# Patient Record
(demographics unavailable — no encounter records)

---

## 2024-12-19 NOTE — DATA REVIEWED
[No studies available for review at this time] : No studies available for review at this time [de-identified] : Right - mild HL after 4000Hz Left - mild to moderate sensorineural hearing loss after 1000Hz Impedance testing reveals normal Type A tympanograms bilaterally

## 2024-12-19 NOTE — HISTORY OF PRESENT ILLNESS
[de-identified] : 69 y/o F Hx of Waldenstrom's Macroglobulinemia.  Herewith many years of nasal congestion, infrequent sinus pressure.  One sinus infection per year.  Uses Nasacort daily with some improvement, but congestion not resolved.   Used Flonase in the past however felt it stopped working for her.  Sense of smell and taste are good.   Pos Rhinitis every morning for about one hour.  Also gets "sharp pain" headache to top, sides and back of head with change in position, mostly bending, lasts only 1-2 seconds at a time.   Does get Vertigo that lasts a few seconds when turning her head.  Feels it occurs more with turning left and started about 6-10 months ago.  No acute change in hearing.  Pos tinnitus that started with taking Wellbutrin.   [FreeTextEntry1] : She is here for f/u. She has been having bilateral clogged ears, tinnitus and intermittent pain in bilateral ears that has been going on for a month. She states her hearing has become worse since her ears have been feeling clogged. She states the clogged ear sensation and tinnitus is constant every day.  Pt denies any nasal drainage, PND, facial pain or pressure, runny nose or sinus infections.  Pt denies any ear drainage. She states she is still having vertigo with certain movemetns where she will feel the room spinning.

## 2024-12-19 NOTE — END OF VISIT
[FreeTextEntry3] : I personally saw and examined Ms. CRIS RAMÍREZ in detail this visit today. I personally reviewed the HPI, PMH, FH, SH, ROS and medications/allergies. I have spoken to MERLIN Ventura regarding the history and have personally determined the assessment and plan of care, and documented this myself. I was present and participated in all key portions of the encounter and all procedures noted above. I have made changes in the body of the note where appropriate.   Attesting Faculty: See Attending Signature Below

## 2024-12-19 NOTE — PHYSICAL EXAM
[Normal] : tympanic membranes are normal in both ears [de-identified] : bilateral cerumen impaction

## 2024-12-19 NOTE — ASSESSMENT
[FreeTextEntry1] : 69y/o female who has been having bilateral clogged ears, tinnitus and intermittent pain in bilateral ears that has been going on for a month.  Excessive wax in bilateral ears -Wax impaction removed from bilateral ear canals   Tinnitus -Audio shows mild HF SNHL at 6/8k but otherwise WNL  Vertigo - VNG done 3/2022 reviewed  - Recommend vestibular therapy

## 2024-12-19 NOTE — PROCEDURE
[FreeTextEntry3] : Cerumen removed with suction and curette from bilateral ear canals. After removal of cerumen canal noted to be normal without edema, purulence or inflammation. Patient tolerated procedure well.

## 2024-12-27 NOTE — RESULTS/DATA
[FreeTextEntry1] : WBC 16,130 Hgb 11.1 Hct 36.9 MCV 96.6 Platelets 361,000 Diff 45P 44L 7M 1 Imm gran 2Eo 1Ba ANC 7,280   9/14/24 CT abdomen and Pelvis w/ Oral Contrast Impression: No acute intra-abdominal pathology.   9/10/24 CMP eGFR 54  SPEP M-spike 0.9, gamma-migrating paraprotein identified.  IgG 566 IgA 45 IgM 1352  SFLC kappa 4.83 lambda 1.53 ratio 3.16  Viscosity Serum 1.9

## 2024-12-27 NOTE — ADDENDUM
[FreeTextEntry1] :  I, Carroll Beth, acted solely as a scribe for Dr. Santosh Van on 12/27/2024.  All medical entries made by the Scribe were at my, Dr. Santosh Van's, direction and personally dictated by me on 12/27/2024 . I have reviewed the chart and agree that the record accurately reflects my personal performance of the history, physical exam, assessment and plan. I have also personally directed, reviewed, and agreed with the chart.

## 2024-12-27 NOTE — HISTORY OF PRESENT ILLNESS
[Disease:__________________________] : Disease: [unfilled] [de-identified] : 2000; 2018 Right breast DCIS- ER/SC+; Anastrozole to 12/2023 2008 thyroid cancer -- thyroidectomy; DOBBINS Iron deficiency anemia Leukocytosis [de-identified] : BM+ in 2011 [de-identified] : She feels well. Feeling of fullness below her right ribs area along with dull pain radiating to her back has resolved. Chronic positional vertigo persists. Also notes occasional swollen glands. She notes no headaches, visual problems, fevers, night sweats, chest pain, SOB, abdominal pain, bleeding, bruising, melena, BRBPR, hematuria, vaginal bleeding. Weight is stable. Had flu shot, COVID booster, and RSV vaccine.

## 2024-12-27 NOTE — PHYSICAL EXAM
[Fully active, able to carry on all pre-disease performance without restriction] : Status 0 - Fully active, able to carry on all pre-disease performance without restriction [Normal] : affect appropriate [de-identified] : Ventral hernia

## 2024-12-27 NOTE — ASSESSMENT
[Palliative Care Plan] : not applicable at this time [FreeTextEntry1] : 69 YO F with Waldenstrom's macroglobulinemia diagnosed in 2011 who has been clinically stable and under observation alone. She has had a chronic mild anemia attributed to Waldenstrom's and a chronic unexplained leukocytosis - perhaps related to lithium therapy.  Plan: Observe CMP, LDH, SPEP, Quantitative immunoglobulins, SFLC, Viscosity RTC 3 months.

## 2025-01-14 NOTE — PHYSICAL EXAM
[Alert] : alert [Oriented x 3] : ~L oriented x 3 [Well Nourished] : well nourished [Conjunctiva Non-injected] : conjunctiva non-injected [No Visual Lymphadenopathy] : no visual  lymphadenopathy [No Clubbing] : no clubbing [No Edema] : no edema [No Bromhidrosis] : no bromhidrosis [No Chromhidrosis] : no chromhidrosis [FreeTextEntry3] : The patient is well-appearing, in no acute distress, alert and oriented x 3. Mood and affect are normal. A complete cutaneous examination of the scalp, face, neck, chest, abdomen, back, bilateral arms, bilateral legs, buttocks, digits, nails, eyelids, conjunctiva and lips reveals the following significant findings:\par  -Tan to dark brown stuck-on plaques on the trunk and extremities \par  -Tan to dark brown macules on the trunk and extremities with no concerning dermoscopic features

## 2025-01-14 NOTE — HISTORY OF PRESENT ILLNESS
[FreeTextEntry1] : growths [de-identified] : 70F with no personal hx of skin cancer here for growths on the trunk and abdomen. Otherwise, no new, evolving, or symptomatic skin lesions.   Hx of DCIS and Waldenstrom's Macroglobulenima

## 2025-02-19 NOTE — REVIEW OF SYSTEMS
[Feeling Fatigued] : feeling fatigued [Change in Appetite] : no change in appetite [Dizziness] : dizziness [Negative] : Cardiovascular [FreeTextEntry3] : conjunctival hemorrhage, no pain or vision loss [de-identified] : stable [de-identified] : no recent changes

## 2025-02-19 NOTE — HISTORY OF PRESENT ILLNESS
[FreeTextEntry1] : Interval History:   History: She reports intermittent dizziness, room spinning for about a year, occurs daily. This occurs when siting down on a cushy, soft chair or when turning her head to the side. No syncope. No chest pain. No palpitations. No falls. She was evaluated by an ENT and Neurologist. There was no evidence of vertigo and MRI of the brain was normal. She was advised to have cardiovascular evaluation. She denies any history of migraine or pulsatile tinnitus.  She thinks the symptoms correlate with adjustment of her thyroid hormone replacement, which was increased to 88 mcg, but then decreased to 75 mcg at her urging. She plans to establish care with a new Endocrinologist, scheduled in January.  Not aware of any history of cardiovascular disease. No recent lipids, A1c. Non smoker and no family history of premature/early CAD.  2023: She continues to feel dizzy intermittently, particularly when sitting down. No exertional symptoms. No appreciable improvement since adjustment of levothyroxine.  Saw Dr. Blake and remains on levothyroxine. Carotid duplex - bilateral heterogenous internal carotid plaque, non-obstructive. 14 day patch monitor showed no arrhythmia and normal HR during symptomatic episodes. Echo normal function.  Daily home BP readings over the past two months reviewed, SBP 120s-150s, majority 135-140. Normal diastolic BP.  2023: She notes increased fatigue (falls asleep on the couch in the late afternoon) and weight loss. TSH/FT4 normal in July, on 75 levothyroxine. Continues to have dizziness when she sits down or moves quickly. No palpitations. No chest pain. No shortness of breath. IgM levels stable, now followed by Dr. Van, who ordered abdomen/pelvic CT given weight loss. Dr. Winslow checked lipids and told her they were "good" - colonoscopy also up to date per patient.  2024: Feels OK. Dizziness less bothersome lately, but still present. Occurs when turning her head at times. BP mostly controlled on amlodipine. Has appointment with her endocrinologist. Shi remains quiescent.  2024: Decided to retire from her job at the [employer] due to fatigue. Labs done by Dr. Van this summer for Sommer's stable. Her Psychiatrist recently raised concerns about an increase in the creatinine on blood tests done to monitor chronic lithium therapy, however, no changes made. Positional dizziness persists. BP is controlled on current therapy. Weight back up a few pounds.   Cardiovascular Summary: ---------------------------------------------- EC2025:  2024: NSR 67 bpm, normal ECG and QTc = 431 ms 2024: NSR, possible LAE, QTc 452 ms, otherwise normal 2023: NSR 70 bpm, normal ECG 2/10/2023: NSR 78 bpm, possible LAE. No significant change from prior. 2022: NSR 80 bpm, normal ECG ---------------------------------------------- Remote/ambulatory rhythm monitorin2022: 14 day Zio patch monitor, rare VPCs, sinus rhythm during symptomatic episodes --------------------------------------------- Echo: 2023: EF 68 %, GLS -18, no structural heart disease -------------------------------------------- Carotid: 22: mild heterogeneous plaque in proximal internal carotids, 16-49 % bilaterally.  -------------------------------------------- Radiology: 2023: CT abdomen/pelvis - no acute abnormalities or interval changes 2023: CT-angio head/neck - normal study 2022: mild atherosclerosis of aorta

## 2025-02-19 NOTE — REASON FOR VISIT
Normal [Spouse] : spouse [FreeTextEntry3] : Dr. Naidu, Dr. Van [FreeTextEntry1] : ----------------------------------------------------------------------- CRIS RAMÍREZ is a 70-year-old woman with history of DCIS, Waldenstrom's macroglobulinemia, thyroidectomy in 2008, bipolar disorder on lithium, hypertension, and carotid atherosclerosis seen for follow up of dizziness and cardiovascular risk factors.  Prior Cancer Treatments: ------------------------------------------------------------------------ Chemo/targeted therapy: Anastrozole 4/2019-2023 ------------------------------------------------------------------------ Surgery: 2000, 1/2/2019: right lumpectomy ------------------------------------------------------------------------ Radiation: none

## 2025-02-19 NOTE — PHYSICAL EXAM
[Carotid Bruit] : carotid bruit [Normal] : normal S1, S2, no murmur, no rub, no gallop [Clear Lung Fields] : clear lung fields [Good Air Entry] : good air entry [No Respiratory Distress] : no respiratory distress  [Normal Gait] : normal gait [Gait - Sufficient for Exercise Testing] : gait - sufficient for exercise testing [No Edema] : no edema [No Cyanosis] : no cyanosis [No Clubbing] : no clubbing [Moves all extremities] : moves all extremities [Normal Speech] : normal speech [Alert and Oriented] : alert and oriented [Normal memory] : normal memory [de-identified] : subconjunctival blood  [de-identified] : right carotid

## 2025-02-19 NOTE — PHYSICAL EXAM
[Carotid Bruit] : carotid bruit [Normal] : normal S1, S2, no murmur, no rub, no gallop [Clear Lung Fields] : clear lung fields [Good Air Entry] : good air entry [No Respiratory Distress] : no respiratory distress  [Normal Gait] : normal gait [Gait - Sufficient for Exercise Testing] : gait - sufficient for exercise testing [No Edema] : no edema [No Cyanosis] : no cyanosis [No Clubbing] : no clubbing [Moves all extremities] : moves all extremities [Normal Speech] : normal speech [Alert and Oriented] : alert and oriented [Normal memory] : normal memory [de-identified] : subconjunctival blood  [de-identified] : right carotid

## 2025-02-19 NOTE — REASON FOR VISIT
[Spouse] : spouse [FreeTextEntry3] : Dr. Naidu, Dr. Van [FreeTextEntry1] : ----------------------------------------------------------------------- CRIS RAMÍREZ is a 70-year-old woman with history of DCIS, Waldenstrom's macroglobulinemia, thyroidectomy in 2008, bipolar disorder on lithium, hypertension, and carotid atherosclerosis seen for follow up of dizziness and cardiovascular risk factors.  Prior Cancer Treatments: ------------------------------------------------------------------------ Chemo/targeted therapy: Anastrozole 4/2019-2023 ------------------------------------------------------------------------ Surgery: 2000, 1/2/2019: right lumpectomy ------------------------------------------------------------------------ Radiation: none

## 2025-02-19 NOTE — REVIEW OF SYSTEMS
[Feeling Fatigued] : feeling fatigued [Change in Appetite] : no change in appetite [Dizziness] : dizziness [Negative] : Cardiovascular [FreeTextEntry3] : conjunctival hemorrhage, no pain or vision loss [de-identified] : stable [de-identified] : no recent changes

## 2025-02-19 NOTE — PHYSICAL EXAM
[Carotid Bruit] : carotid bruit [Normal] : normal S1, S2, no murmur, no rub, no gallop [Clear Lung Fields] : clear lung fields [Good Air Entry] : good air entry [No Respiratory Distress] : no respiratory distress  [Normal Gait] : normal gait [Gait - Sufficient for Exercise Testing] : gait - sufficient for exercise testing [No Edema] : no edema [No Cyanosis] : no cyanosis [No Clubbing] : no clubbing [Moves all extremities] : moves all extremities [Normal Speech] : normal speech [Alert and Oriented] : alert and oriented [Normal memory] : normal memory [de-identified] : subconjunctival blood  [de-identified] : right carotid

## 2025-02-19 NOTE — REVIEW OF SYSTEMS
[Feeling Fatigued] : feeling fatigued [Change in Appetite] : no change in appetite [Dizziness] : dizziness [Negative] : Cardiovascular [FreeTextEntry3] : conjunctival hemorrhage, no pain or vision loss [de-identified] : stable [de-identified] : no recent changes

## 2025-02-19 NOTE — ASSESSMENT
[FreeTextEntry1] : ===================================================== 1. Dizziness: etiology undetermined at present. Carotid bruit on the right, but no tortuosity or significant stenosis noted on duplex ultrasound. Neuro and ENT workup not diagnostic. ? Lithium - no evidence of arrhythmia or cardiac cause - CT-angiogram of head/neck normal  2. Essential hypertension: BP controlled currently - amlodipine 2.5 mg daily  3. Waldenstrom's, Breast Cancer, atherosclerotic plaque: discussed ASCVD risk reduction with patient who participated in shared decision making. - rosuvastatin 5 mg daily for atherosclerosis, LDL 95 11/2022 - lipid panel 2/2024: LDL 44, non-HDL 61  4. Lithium associated renal concerns. Creatinine normal 6/18/2024, though increase from ~ 0.7 to ~ 1 over past year or so (with some variation) - provided referral to onco-Nephrology to advice - will have labs drawn by her psychiatrist forwarded to me  Above recommendations discussed all questions were answered to the best of my ability and her apparent satisfaction.

## 2025-02-19 NOTE — HISTORY OF PRESENT ILLNESS
[FreeTextEntry1] : Interval History:   History: She reports intermittent dizziness, room spinning for about a year, occurs daily. This occurs when siting down on a cushy, soft chair or when turning her head to the side. No syncope. No chest pain. No palpitations. No falls. She was evaluated by an ENT and Neurologist. There was no evidence of vertigo and MRI of the brain was normal. She was advised to have cardiovascular evaluation. She denies any history of migraine or pulsatile tinnitus.  She thinks the symptoms correlate with adjustment of her thyroid hormone replacement, which was increased to 88 mcg, but then decreased to 75 mcg at her urging. She plans to establish care with a new Endocrinologist, scheduled in January.  Not aware of any history of cardiovascular disease. No recent lipids, A1c. Non smoker and no family history of premature/early CAD.  2023: She continues to feel dizzy intermittently, particularly when sitting down. No exertional symptoms. No appreciable improvement since adjustment of levothyroxine.  Saw Dr. Blake and remains on levothyroxine. Carotid duplex - bilateral heterogenous internal carotid plaque, non-obstructive. 14 day patch monitor showed no arrhythmia and normal HR during symptomatic episodes. Echo normal function.  Daily home BP readings over the past two months reviewed, SBP 120s-150s, majority 135-140. Normal diastolic BP.  2023: She notes increased fatigue (falls asleep on the couch in the late afternoon) and weight loss. TSH/FT4 normal in July, on 75 levothyroxine. Continues to have dizziness when she sits down or moves quickly. No palpitations. No chest pain. No shortness of breath. IgM levels stable, now followed by Dr. Van, who ordered abdomen/pelvic CT given weight loss. Dr. Winslow checked lipids and told her they were "good" - colonoscopy also up to date per patient.  2024: Feels OK. Dizziness less bothersome lately, but still present. Occurs when turning her head at times. BP mostly controlled on amlodipine. Has appointment with her endocrinologist. Shi remains quiescent.  2024: Decided to retire from her job at the [employer] due to fatigue. Labs done by Dr. Van this summer for oSmmer's stable. Her Psychiatrist recently raised concerns about an increase in the creatinine on blood tests done to monitor chronic lithium therapy, however, no changes made. Positional dizziness persists. BP is controlled on current therapy. Weight back up a few pounds.   Cardiovascular Summary: ---------------------------------------------- EC2025:  2024: NSR 67 bpm, normal ECG and QTc = 431 ms 2024: NSR, possible LAE, QTc 452 ms, otherwise normal 2023: NSR 70 bpm, normal ECG 2/10/2023: NSR 78 bpm, possible LAE. No significant change from prior. 2022: NSR 80 bpm, normal ECG ---------------------------------------------- Remote/ambulatory rhythm monitorin2022: 14 day Zio patch monitor, rare VPCs, sinus rhythm during symptomatic episodes --------------------------------------------- Echo: 2023: EF 68 %, GLS -18, no structural heart disease -------------------------------------------- Carotid: 22: mild heterogeneous plaque in proximal internal carotids, 16-49 % bilaterally.  -------------------------------------------- Radiology: 2023: CT abdomen/pelvis - no acute abnormalities or interval changes 2023: CT-angio head/neck - normal study 2022: mild atherosclerosis of aorta

## 2025-02-25 NOTE — ADDENDUM
[FreeTextEntry1] : Blood will be drawn in office today.  This note was written by Cecily Bergman on 02/25/2025 acting as medical scribe for Dr. Artie Blake. I, Dr. Artie Blake, have read and attest that all the information, medical decision making and discharge instructions within are true and accurate.

## 2025-02-25 NOTE — HISTORY OF PRESENT ILLNESS
[FreeTextEntry1] : Ms. CRIS RAMÍREZ is a 70-year-old female who returns with regard to a history of thyroid cancer s/p total thyroidectomy 2008-Do not have details with regard to her thyroid ca and she too is unsure of the type of thyroid cancer. I have again asked that she try to obtain old records. She reports needing to take radioactive pill after the surgery.   Medical History: Rosacea, Waldenstrom's Disease dx 2011, depression, infertility, MELVI-BSO, cholecystectomy, bipolar disorder, Breast Cancer, hypertension, vitamin D deficiency  For the thyroid she is currently taking Synthroid 75 mcg daily, she was previously on 88 mcg daily.   She has been compliant in taking the Synthroid daily, away from food or any medication that may inhibit absorption. She has tolerated this medication well without any apparent adverse effects. She denies any temperature intolerance, significant weight changes, or severe fatigue. She in addition denies any palpitations, tremors, anxiousness, change in bowel habits or significant change in moods.  Labs 05/21/24 TSH: 2.66 FT4: 1.4 FT3: low at 1.92  Over the past 1.5 years, patient notes weight loss without clear reason. Weight in March 2022 was at 124 lbs, down to 108 in August 2023, down to 99 lbs in December 2023. Her weight has since increased and was at 109 lbs at the last visit in May 2024, her current weight is ?? lbs. She has been trying to intentionally eat high calorie foods of late.  ____________________________________________________________________________________________________ Too does have hx of Osteopenia  Dexa Scan dated 11/22/2022  RESULTS:  Spine: -0.8, normal; previously -0.3. Femoral neck: -1.1 , osteopenia; previously -1.0. Total hip: -0.4 , normal; previously -0.4.  FRACTURE RISK: Using the FRAX 10 year fracture risk calculator the patient's ten-year risk of any fracture is 7.6% and the patient's risk of hip fracture is 0.8%.   No medication use at this time with regard to her bones. She denies any fractures, denies hx of kidney stones, denies use of corticosteroids for prolonged period of time, no use of PPI, agents, Hysterectomy late 1990s, denies smoking hx. Patient is lactose intolerant minimal intake of dairy  ____________________________________________________________________________________________________  Medications include Carvedilol 3.125 BID, Rosuvastatin 5 mg, Bupropion 150 mg, lithium 2x a day, wellbutrin 300 mg, ambien 2.5 mg 1/2 pill at night, D3 TID unsure dose, B6, B12, Vitamin C  - Off Anastrozole

## 2025-02-25 NOTE — PHYSICAL EXAM
[Alert] : alert [Well Nourished] : well nourished [No Acute Distress] : no acute distress [Well Developed] : well developed [Normal Sclera/Conjunctiva] : normal sclera/conjunctiva [EOMI] : extra ocular movement intact [No Proptosis] : no proptosis [Normal Oropharynx] : the oropharynx was normal [Thyroid Not Enlarged] : the thyroid was not enlarged [No Thyroid Nodules] : no palpable thyroid nodules [No Respiratory Distress] : no respiratory distress [No Accessory Muscle Use] : no accessory muscle use [Clear to Auscultation] : lungs were clear to auscultation bilaterally [Normal S1, S2] : normal S1 and S2 [Normal Rate] : heart rate was normal [Regular Rhythm] : with a regular rhythm [No Edema] : no peripheral edema [Pedal Pulses Normal] : the pedal pulses are present [Normal Bowel Sounds] : normal bowel sounds [Not Tender] : non-tender [Not Distended] : not distended [Soft] : abdomen soft [Normal Anterior Cervical Nodes] : no anterior cervical lymphadenopathy [No Spinal Tenderness] : no spinal tenderness [Spine Straight] : spine straight [No Stigmata of Cushings Syndrome] : no stigmata of Cushings Syndrome [Normal Gait] : normal gait [Normal Strength/Tone] : muscle strength and tone were normal [No Rash] : no rash [Normal Reflexes] : deep tendon reflexes were 2+ and symmetric [Oriented x3] : oriented to person, place, and time [Acanthosis Nigricans] : no acanthosis nigricans

## 2025-03-25 NOTE — ASSESSMENT
[Palliative Care Plan] : not applicable at this time [FreeTextEntry1] : 69 YO F with Waldenstrom's macroglobulinemia diagnosed in 2011 who has been clinically stable and under observation alone. She has had a chronic mild anemia attributed to Waldenstrom's and a chronic unexplained leukocytosis - perhaps related to lithium therapy. Her hgb has fallen following recent hospitalization. Will await lab evaluation outlined below.  Plan: Observe CMP, LDH, SPEP, Quantitative immunoglobulins, SFLC, Viscosity, ferritin, Iron/TIBC RTC 1 month.

## 2025-03-25 NOTE — HISTORY OF PRESENT ILLNESS
[Disease:__________________________] : Disease: [unfilled] [de-identified] : 2000; 2018 Right breast DCIS- ER/UT+; Anastrozole to 12/2023 2008 thyroid cancer -- thyroidectomy; DOBBINS Iron deficiency anemia Leukocytosis [de-identified] : BM+ in 2011 [de-identified] : Had an episode of lithium toxicity 2 weeks ago for which she was admitted to [facility]. She feels well now. Chronic positional vertigo persists. She notes no swollen glands, headaches, visual problems, fever, night sweats, chest pain, SOB, abdominal pain, bleeding, bruising, BRBPR, melena, hematuria, vaginal bleeding. Weight is stable.

## 2025-03-25 NOTE — CONSULT LETTER
[Dear  ___] : Dear  [unfilled], [Consult Letter:] : I had the pleasure of evaluating your patient, [unfilled]. [Please see my note below.] : Please see my note below. [Sincerely,] : Sincerely, [FreeTextEntry3] : edson reagan

## 2025-03-25 NOTE — PHYSICAL EXAM
[Fully active, able to carry on all pre-disease performance without restriction] : Status 0 - Fully active, able to carry on all pre-disease performance without restriction [Normal] : affect appropriate [de-identified] : Ventral hernia

## 2025-03-25 NOTE — ASSESSMENT
[FreeTextEntry1] : 70 years old woman on lithium here for evaluation of elevated creatinine.    CKD Stage 2 -- The patient's creatinine has risen slowly over the course of years from 0.8 more recently to 1.22.  Lithium dose already reduced.  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  However the one atypical feature of the case is the presence of microhematuria.  GENO from lithium would classically give you a bland urinary sediment with no blood and no protein.  Will repeat blood and proteinuria today.  If significant proteinuria will consider expanding additional work up.  Will also send renal ultrasound for further evaluation.  At this point the risk benefit does not favor stopping lithium.  Will, instead, monitor periodically.  Hypertension -- Her bp was better controlled with amlodipine.  No objection to re-trial amlodipine as no significant interaction with lithium is expected.  Micro hematuria -- as above.  The time spent is inclusive of the time it took to see, evaluate, and manage the patient.  Time is inclusive of the time taken to review chart, reconcile medications, document findings, and communicate with other providers (when applicable).

## 2025-03-25 NOTE — HISTORY OF PRESENT ILLNESS
[FreeTextEntry1] : 3/25/25 -- Today I had the pleasure of meeting Justine Delgadillo.  She is a 70 year old retired  from [employer] here with her .  Has two children.  Medical history includes thyroid cancer s/p total resection, breast cancer s/p lumpectomy X2, and waldenstroms but no therapy.  She is being evaluated today for an insidious rise in her creatinine.  Of particular note she has had bipolar disorder.  She was on valproic acid for years but went on lithium about 10 years ago.  She more recently developed hypertension and was put on amlodipine about 2 years ago.   Due to a concern for interaction with lithium amlodipine was changed to coreg.  Yet in spite of this she actually still developed acute lithium toxicity of 1.7 complicated by tremors and was hospitalized.  Her dose was reduced.  Her creatinine was 0.8 but over the years has now risen to 1.2.  Only other notable item on history is a life long micro hematuria but no family history of kidney disease.

## 2025-03-25 NOTE — REVIEW OF SYSTEMS
[Feeling Poorly] : not feeling poorly [Recent Weight Gain (___ Lbs)] : recent [unfilled] ~Ulb weight gain [Eyesight Problems] : no eyesight problems [Chest Pain] : no chest pain [Lower Ext Edema] : no lower extremity edema [Shortness Of Breath] : no shortness of breath [Abdominal Pain] : no abdominal pain [Vomiting] : no vomiting [Dysuria] : no dysuria [Dizziness] : no dizziness [Fainting] : no fainting [Anxiety] : no anxiety [Depression] : no depression [Easy Bleeding] : no tendency for easy bleeding [Easy Bruising] : no tendency for easy bruising [FreeTextEntry2] : has been taking boost to gain weight [FreeTextEntry8] : no frequency nor excessive thirst

## 2025-03-25 NOTE — RESULTS/DATA
[FreeTextEntry1] : WBC 15,900 Hgb 9.8 Hct 31.7 MCV 94.6 Platelets 324,000 Diff 54P 36L 8M 1Imm gran 2Eo 1Ba ANC 8,510   2/25/25 CMP Glu 108 eGFR 48   12/27/24 CMP Glu 112 eGFR 53   SPEP M-spike 1.0, gamma-migrating paraprotein identified.  IgG 599 IgA 49 IgM 1478  SFLC kappa 5.30 lambda 1.75 ratio 3.03  Serum viscosity 1.9

## 2025-04-22 NOTE — ASSESSMENT
[Palliative Care Plan] : not applicable at this time [FreeTextEntry1] : 71 YO F with Waldenstrom's macroglobulinemia diagnosed in 2011 who has been clinically stable and under observation alone. She has had a chronic mild anemia attributed to Waldenstrom's and a chronic unexplained leukocytosis - perhaps related to lithium therapy. Her hgb had fallen following recent hospitalization, now recovering. IgM has risen, but M spike stable.  Plan: Observe CMP, LDH, SPEP, quant Ig, SFLC, Ferritin, Iron/TIBC PCP f/u regarding fatigue  RTC 2 months

## 2025-04-22 NOTE — ADDENDUM
[FreeTextEntry1] : I, Viet Reilly, acted solely as a scribe for Dr. Santosh Van on 04/22/2025 . All medical entries made by the Scribe were at my, Dr. Santosh Van's, direction and personally dictated by me on 04/22/2025 . I have reviewed the chart and agree that the record accurately reflects my personal performance of the history, physical exam, assessment and plan. I have also personally directed, reviewed, and agreed with the chart.

## 2025-04-22 NOTE — PHYSICAL EXAM
[Restricted in physically strenuous activity but ambulatory and able to carry out work of a light or sedentary nature] : Status 1- Restricted in physically strenuous activity but ambulatory and able to carry out work of a light or sedentary nature, e.g., light house work, office work [Normal] : affect appropriate [de-identified] : Sinuses nontender [de-identified] : Ventral hernia [de-identified] : Shoulders nontender, FROM

## 2025-04-22 NOTE — REVIEW OF SYSTEMS
[Diarrhea: Grade 0] : Diarrhea: Grade 0 [Dizziness] : dizziness [Negative] : Allergic/Immunologic [Fatigue] : fatigue [Joint Pain] : joint pain [de-identified] : HA

## 2025-04-22 NOTE — HISTORY OF PRESENT ILLNESS
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  Affects her ability to perform. Reports discomfort in both of her shoulders when she reaches out and tries to grab something. Chronic positional vertigo persists. Reports chronic frontal sinus headaches. She notes no swollen glands, visual problems, fever, night sweats, chest pain, SOB, abdominal pain, bleeding, bruising, BRBPR, melena, hematuria, vaginal bleeding. Weight is stable.

## 2025-04-22 NOTE — RESULTS/DATA
[FreeTextEntry1] : WBC 18,260 Hgb 10.8 Hct 35.4 MCV 95.4 Platelets 361,000 Diff 44P 46L 7M 1 Imm gran 2Eo 1Ba ANC 8,100  3/25/25 CMP Glu 110 eGFR 56  SPEP M-spike 0.9, gamma-migrating paraprotein identified.  IgG 537 IgA 39 IgM 2048 SFLC kappa 3.97 lambda 1.67 ratio 2.38 Iron/TIBC/sat% 52/237/22% Ferritin 141 Serum viscosity 1.9

## 2025-04-30 NOTE — PHYSICAL EXAM
[Normal] : supple, no neck mass and thyroid not enlarged [Normal Supraclavicular Lymph Nodes] : normal supraclavicular lymph nodes [Normal Axillary Lymph Nodes] : normal axillary lymph nodes [Normal] : oriented to person, place and time, with appropriate affect [de-identified] : no masses

## 2025-04-30 NOTE — HISTORY OF PRESENT ILLNESS
[de-identified] : Ms. CRIS RAMÍREZ is a 70-year-old woman here for a follow-up visit.    Initially consulted 12/2018, referred by Dr. Jaci Brandt. Her history is significant for right DCIS s/p right lumpectomy in 2000 and benign right breast surgery in 1990.  Patient reports she is BRCA Negative.   Family history of breast cancer involving her sister and maternal aunts PMH notable for thyroid cancer s/p thyroidectomy, DCIS s/p lumpectomy 2000, Dense breasts, Rosacea, Waldenstrom's Disease, depression, infertility, MELVI-BSO, cholecystectomy.   s/p RIGHT mandy lumpectomy x 2 sites 1/2019, path: DCIS, 2 mm, margins negative, ER+/MI+, pTis  Oncotype DX 47  B/L mammo/sono 3/2025 - BIRADS 2   completed 5 years of Anastrozole in 2024

## 2025-04-30 NOTE — ASSESSMENT
[FreeTextEntry1] : IMP: HX of RIGHT DCIS in 2000 & 2019 completed 5 years of Anastrozole in 2024  s/p RIGHT mandy lumpectomy x 2 sites 1/2019, path: DCIS, 2 mm, margins negative, ER+/[place]+, pTis  Oncotype DX 47  B/L mammo/sono 3/2025 - BIRADS 2   PLAN: RTO yearly  B/L mammo/sono 3/2026 no further MRI's

## 2025-05-08 NOTE — HISTORY OF PRESENT ILLNESS
[de-identified] : Ms. CRIS RAMÍREZ is a 70-year-old woman here for a follow-up visit.    Initially consulted 12/2018, referred by Dr. Jaci Brandt. Her history is significant for right DCIS s/p right lumpectomy in 2000 and benign right breast surgery in 1990.  Patient reports she is BRCA Negative.   Family history of breast cancer involving her sister and maternal aunts PMH notable for thyroid cancer s/p thyroidectomy, DCIS s/p lumpectomy 2000, Dense breasts, Rosacea, Waldenstrom's Disease, depression, infertility, MELVI-BSO, cholecystectomy.   s/p RIGHT mandy lumpectomy x 2 sites 1/2019, path: DCIS, 2 mm, margins negative, ER+/[place]+, pTis  Oncotype DX 47  B/L mammo/sono 3/2025 - BIRADS 2   completed 5 years of Anastrozole in 2024

## 2025-05-08 NOTE — ASSESSMENT
[FreeTextEntry1] : IMP: HX of RIGHT DCIS in 2000 & 2019 completed 5 years of Anastrozole in 2024  s/p RIGHT mandy lumpectomy x 2 sites 1/2019, path: DCIS, 2 mm, margins negative, ER+/[place]+, pTis  Oncotype DX 47  B/L mammo/sono 3/2025 - BIRADS 2   PLAN: RTO yearly  B/L mammo/sono 3/2026 no further MRI's

## 2025-05-08 NOTE — HISTORY OF PRESENT ILLNESS
[de-identified] : Ms. CRIS RAMÍREZ is a 70-year-old woman here for a follow-up visit.    Initially consulted 12/2018, referred by Dr. Jaci Brandt. Her history is significant for right DCIS s/p right lumpectomy in 2000 and benign right breast surgery in 1990.  Patient reports she is BRCA Negative.   Family history of breast cancer involving her sister and maternal aunts PMH notable for thyroid cancer s/p thyroidectomy, DCIS s/p lumpectomy 2000, Dense breasts, Rosacea, Waldenstrom's Disease, depression, infertility, MELVI-BSO, cholecystectomy.   s/p RIGHT mandy lumpectomy x 2 sites 1/2019, path: DCIS, 2 mm, margins negative, ER+/[place]+, pTis  Oncotype DX 47  B/L mammo/sono 3/2025 - BIRADS 2   completed 5 years of Anastrozole in 2024

## 2025-05-08 NOTE — HISTORY OF PRESENT ILLNESS
[de-identified] : Ms. CRIS RAMÍREZ is a 70-year-old woman here for a follow-up visit.    Initially consulted 12/2018, referred by Dr. Jaci Brandt. Her history is significant for right DCIS s/p right lumpectomy in 2000 and benign right breast surgery in 1990.  Patient reports she is BRCA Negative.   Family history of breast cancer involving her sister and maternal aunts PMH notable for thyroid cancer s/p thyroidectomy, DCIS s/p lumpectomy 2000, Dense breasts, Rosacea, Waldenstrom's Disease, depression, infertility, MELVI-BSO, cholecystectomy.   s/p RIGHT mandy lumpectomy x 2 sites 1/2019, path: DCIS, 2 mm, margins negative, ER+/[place]+, pTis  Oncotype DX 47  B/L mammo/sono 3/2025 - BIRADS 2   completed 5 years of Anastrozole in 2024

## 2025-05-08 NOTE — ASSESSMENT
[FreeTextEntry1] : IMP: HX of RIGHT DCIS in 2000 & 2019 completed 5 years of Anastrozole in 2024  s/p RIGHT mandy lumpectomy x 2 sites 1/2019, path: DCIS, 2 mm, margins negative, ER+/[place]+, pTis  Oncotype DX 47  B/L mammo/sono 3/2025 - BIRADS 2   PLAN: RTO yearly  B/L mammo/sono 3/2026 no further MRI's

## 2025-05-08 NOTE — ASSESSMENT
[FreeTextEntry1] : IMP: HX of RIGHT DCIS in 2000 & 2019 completed 5 years of Anastrozole in 2024  s/p RIGHT mandy lumpectomy x 2 sites 1/2019, path: DCIS, 2 mm, margins negative, ER+/[place]+, pTis  Oncotype DX 47  B/L mammo/sono 3/2025 - BIRADS 2   PLAN: RTO yearly  B/L mammo/sono 3/2026 no further MRI's

## 2025-07-01 NOTE — ADDENDUM
[FreeTextEntry1] : I, Viet Reilly, acted solely as a scribe for Dr. Santosh Van on 07/01/2025 . All medical entries made by the Scribe were at my, Dr. Santosh Van's, direction and personally dictated by me on 07/01/2025 . I have reviewed the chart and agree that the record accurately reflects my personal performance of the history, physical exam, assessment and plan. I have also personally directed, reviewed, and agreed with the chart.

## 2025-07-01 NOTE — PHYSICAL EXAM
[Fully active, able to carry on all pre-disease performance without restriction] : Status 0 - Fully active, able to carry on all pre-disease performance without restriction [Normal] : affect appropriate [de-identified] : Ventral hernia

## 2025-07-01 NOTE — HISTORY OF PRESENT ILLNESS
[Disease:__________________________] : Disease: [unfilled] [de-identified] : 2000; 2018 Right breast DCIS- ER/DE+; Anastrozole to 12/2023 2008 thyroid cancer -- thyroidectomy; DOBBINS Iron deficiency anemia Leukocytosis [de-identified] : BM+ in 2011 [de-identified] : She feels well. Fatigue has improved. Discomfort in both of her shoulders when she reaches out and tries to grab something persists. Chronic positional vertigo persists. Frontal sinus headaches have resolved. She notes no swollen glands, visual problems, fever, night sweats, chest pain, SOB, abdominal pain, bleeding, bruising, BRBPR, melena, hematuria, vaginal bleeding, skeletal pain. Weight is stable.

## 2025-07-01 NOTE — ASSESSMENT
[Palliative Care Plan] : not applicable at this time [FreeTextEntry1] : 71 YO F with Waldenstrom's macroglobulinemia diagnosed in 2011 who has been clinically stable and under observation alone. She has had a chronic mild anemia attributed to Waldenstrom's and a chronic unexplained leukocytosis - perhaps related to lithium therapy. Her hgb had fallen following recent hospitalization, now recovering. IgM has risen, but M spike stable.  Plan: Observe CMP, LDH, SPEP, SPIEP, quant Ig, SFLC RTC 3 months

## 2025-07-01 NOTE — RESULTS/DATA
[FreeTextEntry1] : WBC 16,200 Hgb 10.7 Hct 33.9 MCV 92.6 Platelets 372,000 Diff 1.5% myelocytes 52P 41L 5M 2Eo ANC 8,430   4/22/25 CMP CO2 18 anion gap 19 Glu 163 eGFR 53  SPEP M-spike 1.0, gamma-migrating paraprotein identified.  SPIEP IgM kappa band identified. IgG 564 IgA 38 IgM 1209  SFLC kappa 5.29 lambda 1.85 ratio 2.86 Iron/TIBC/sat% 51/276/18% Ferritin 130

## 2025-07-01 NOTE — REVIEW OF SYSTEMS
[Diarrhea: Grade 0] : Diarrhea: Grade 0 [Joint Pain] : joint pain [Dizziness] : dizziness [Negative] : Neurological